# Patient Record
Sex: MALE | Race: ASIAN | NOT HISPANIC OR LATINO | Employment: PART TIME | URBAN - METROPOLITAN AREA
[De-identification: names, ages, dates, MRNs, and addresses within clinical notes are randomized per-mention and may not be internally consistent; named-entity substitution may affect disease eponyms.]

---

## 2017-03-11 ENCOUNTER — HOSPITAL ENCOUNTER (EMERGENCY)
Facility: HOSPITAL | Age: 27
Discharge: HOME | End: 2017-03-11
Attending: EMERGENCY MEDICINE
Payer: MEDICAID

## 2017-03-11 VITALS
DIASTOLIC BLOOD PRESSURE: 74 MMHG | OXYGEN SATURATION: 98 % | HEIGHT: 66 IN | HEART RATE: 80 BPM | WEIGHT: 145 LBS | SYSTOLIC BLOOD PRESSURE: 134 MMHG | RESPIRATION RATE: 18 BRPM | BODY MASS INDEX: 23.3 KG/M2 | TEMPERATURE: 98 F

## 2017-03-11 DIAGNOSIS — T14.90XA INJURY: ICD-10-CM
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
## 2017-03-11 DIAGNOSIS — T07.XXXA ABRASIONS OF MULTIPLE SITES: ICD-10-CM

## 2017-03-11 PROCEDURE — 25000003 PHARM REV CODE 250: Performed by: EMERGENCY MEDICINE

## 2017-03-11 PROCEDURE — 90471 IMMUNIZATION ADMIN: CPT | Performed by: EMERGENCY MEDICINE

## 2017-03-11 PROCEDURE — 90715 TDAP VACCINE 7 YRS/> IM: CPT | Performed by: EMERGENCY MEDICINE

## 2017-03-11 PROCEDURE — 63600175 PHARM REV CODE 636 W HCPCS: Performed by: EMERGENCY MEDICINE

## 2017-03-11 PROCEDURE — 99283 EMERGENCY DEPT VISIT LOW MDM: CPT

## 2017-03-11 RX ORDER — HYDROCODONE BITARTRATE AND ACETAMINOPHEN 10; 325 MG/1; MG/1
1 TABLET ORAL
Status: COMPLETED | OUTPATIENT
Start: 2017-03-11 | End: 2017-03-11

## 2017-03-11 RX ORDER — SILVER SULFADIAZINE 10 G/1000G
CREAM TOPICAL 2 TIMES DAILY
Qty: 30 G | Refills: 0 | Status: SHIPPED | OUTPATIENT
Start: 2017-03-11

## 2017-03-11 RX ORDER — PROMETHAZINE HYDROCHLORIDE 25 MG/1
25 TABLET ORAL
Status: COMPLETED | OUTPATIENT
Start: 2017-03-11 | End: 2017-03-11

## 2017-03-11 RX ORDER — SILVER SULFADIAZINE 10 G/1000G
1 CREAM TOPICAL
Status: COMPLETED | OUTPATIENT
Start: 2017-03-11 | End: 2017-03-11

## 2017-03-11 RX ORDER — HYDROCODONE BITARTRATE AND ACETAMINOPHEN 5; 325 MG/1; MG/1
1 TABLET ORAL EVERY 4 HOURS PRN
Qty: 12 TABLET | Refills: 0 | Status: SHIPPED | OUTPATIENT
Start: 2017-03-11 | End: 2017-03-21

## 2017-03-11 RX ADMIN — SILVER SULFADIAZINE 1 TUBE: 10 CREAM TOPICAL at 11:03

## 2017-03-11 RX ADMIN — CLOSTRIDIUM TETANI TOXOID ANTIGEN (FORMALDEHYDE INACTIVATED), CORYNEBACTERIUM DIPHTHERIAE TOXOID ANTIGEN (FORMALDEHYDE INACTIVATED), BORDETELLA PERTUSSIS TOXOID ANTIGEN (GLUTARALDEHYDE INACTIVATED), BORDETELLA PERTUSSIS FILAMENTOUS HEMAGGLUTININ ANTIGEN (FORMALDEHYDE INACTIVATED), BORDETELLA PERTUSSIS PERTACTIN ANTIGEN, AND BORDETELLA PERTUSSIS FIMBRIAE 2/3 ANTIGEN 0.5 ML: 5; 2; 2.5; 5; 3; 5 INJECTION, SUSPENSION INTRAMUSCULAR at 11:03

## 2017-03-11 RX ADMIN — PROMETHAZINE HYDROCHLORIDE 25 MG: 25 TABLET ORAL at 11:03

## 2017-03-11 RX ADMIN — HYDROCODONE BITARTRATE AND ACETAMINOPHEN 1 TABLET: 10; 325 TABLET ORAL at 11:03

## 2017-03-11 NOTE — DISCHARGE INSTRUCTIONS
C?ng c?, Chi  C?NG C? là tình tr?ng giãn và rách các s?i c?. Nó gây ?au, ??c bi?t là khi tonya?n ??ng b?ng c? ?ó. C?ng có th? có d?u hi?u s?ng phù và thâm tím.  Ch?m sóc t?i vika:  1) Gi? yates vùng b? th??ng ?? gi?m ?au và s?ng. ?i?u này ??c bi?t miriam tr?ng camilla vòng 48 gi? ??u tiên.  2) Ch??m túi ?á (?á c?c camilla m?t túi nh?a, b?c camilla m?t t?m kh?n) camilla 20 phút tommy m?i 1-2 gi? camilla ngày ??u tiên. Quý v? nên ti?p t?c ch??m ?á 3-4 l?n/ngày camilla ngày th? keith và th? ba. Tr? khi ???c h??ng d?n khác, vào ngày th? t?, quý v? có th? ngâm n??c nóng ho?c ch??m nóng (kh?n t?m nh? ???c ngâm camilla n??c nóng) 3-4 l?n/ngày camilla khi nh? nh?ng t?p xoa bóp vùng b? ?nh h??ng.  3) Quý v? có th? dùng acetaminophen (Tylenol) ho?c ibuprofen (Motrin, Advil) ?? ki?m soát c?n ?au, tr? khi ???c kê ??n m?t lo?i thu?c khác. [ L?U Ý : N?u quý v? m?c b?nh je ho?c th?n mãn tính ho?c t?ng b? loét d? dày ho?c ch?y máu camilla akash t? (GI), hãy nói tonya?n v?i bác s? c?a quý v? tr??c khi s? d?ng các lo?i thu?c này.]  4) ??i v?i C?NG C? CHÂN: N?u ???c khuy?n cáo dùng N?NG, không ???c t?a toàn b? s?c n?ng lên chân b? th??ng t?i khi quý v? có th? làm v?y mà không b? ?au. Quý v? có th? selma l?i các ho?t ??ng th? alex khi có th? ??ng lên ng?i kuo?ng và ch?y trên chân b? th??ng mà không th?y ?au.  Shan dõi  v?i bác s? c?a quý v? ho?c c? s? này n?u quý v? không b?t ??u c?i thi?n camilla vòng n?m ngày ti?p shan.  Tìm s? miriam tâm y t? ngay l?p t?c  n?u b?t k? ?i?u nào tommy ?ây x?y ra:  -- Ngón terrell ho?c ngón chân s?ng lên, l?nh, xanh tái, tê ho?c ng?a  -- ?au ho?c s?ng h?n  Date Last Reviewed: 11/19/2015  © 8460-9955 [employer]. [address] [place] [address], [place] [address]. All rights reserved. This information is not intended as a substitute for professional medical care. Always follow your healthcare professional's instructions.

## 2017-03-11 NOTE — ED AVS SNAPSHOT
[facility] -   [id number] [facility] [address]               Fausto Garcia V   3/11/2017 10:57 AM   ED    Description:  Male : 1990   Department:  [facility]           Your Care was Coordinated By:     Provider Role From To    Velasquez Zavaleta Jr., MD Attending Provider 17 1057 --      Reason for Visit     Fall           Diagnoses this Visit        Comments    Fall, initial encounter    -  Primary     Injury         Abrasions of multiple sites           ED Disposition     ED Disposition Condition Comment    Discharge             To Do List           Follow-up Information     Follow up with PCP. Call in 2 days.       These Medications        Disp Refills Start End    hydrocodone-acetaminophen 5-325mg (NORCO) 5-325 mg per tablet 12 tablet 0 3/11/2017 3/21/2017    Take 1 tablet by mouth every 4 (four) hours as needed. - Oral    silver sulfADIAZINE 1% (SILVADENE) 1 % cream 30 g 0 3/11/2017     Apply topically 2 (two) times daily. - Topical (Top)      [facility]     [facility] Care [facility] -  Assistance  Registered nurses in the [facility] provide clinical advisement, health education, appointment booking, and other advisory services.  Call for this free service at [phone].             Medications           Message regarding Medications     Verify the changes and/or additions to your medication regime listed below are the same as discussed with your clinician today.  If any of these changes or additions are incorrect, please notify your healthcare provider.        START taking these NEW medications        Refills    hydrocodone-acetaminophen 5-325mg (NORCO) 5-325 mg per tablet 0    Sig: Take 1 tablet by mouth every 4 (four) hours as needed.    Class: Print    Route: Oral    silver sulfADIAZINE 1% (SILVADENE) 1 % cream 0    Sig: Apply topically 2 (two) times daily.    Class: Print    Route: Topical (Top)     "  These medications were administered today        Dose Freq    hydrocodone-acetaminophen 10-325mg per tablet 1 tablet 1 tablet ED 1 Time    Sig: Take 1 tablet by mouth ED 1 Time.    Class: Normal    Route: Oral    promethazine tablet 25 mg 25 mg ED 1 Time    Sig: Take 1 tablet (25 mg total) by mouth ED 1 Time.    Class: Normal    Route: Oral    silver sulfADIAZINE 1% cream 1 Tube 1 Tube ED 1 Time    Sig: Apply 1 Tube topically ED 1 Time.    Class: Normal    Route: Topical (Top)    Tdap vaccine injection 0.5 mL 0.5 mL Once    Sig: Inject 0.5 mLs into the muscle once.    Class: Normal    Route: Intramuscular           Verify that the below list of medications is an accurate representation of the medications you are currently taking.  If none reported, the list may be blank. If incorrect, please contact your healthcare provider. Carry this list with you in case of emergency.           Current Medications     hydrocodone-acetaminophen 5-325mg (NORCO) 5-325 mg per tablet Take 1 tablet by mouth every 4 (four) hours as needed.    silver sulfADIAZINE 1% (SILVADENE) 1 % cream Apply topically 2 (two) times daily.           Clinical Reference Information           Your Vitals Were     BP Pulse Temp Resp Height Weight    154/76 (BP Location: Right arm) 78 98.1 °F (36.7 °C) (Oral) 18 5' 6" (1.676 m) 65.8 kg (145 lb)    SpO2 BMI             98% 23.4 kg/m2         Allergies as of 3/11/2017     No Known Allergies      Immunizations Administered on Date of Encounter - 3/11/2017     Name Date Dose VIS Date Route    TDAP 3/11/2017 0.5 mL 2/24/2015 Intramuscular      ED Micro, Lab, POCT     None      ED Imaging Orders     Start Ordered       Status Ordering Provider    03/11/17 1115 03/11/17 1114  X-Ray Knee 3 View Left  1 time imaging      Final result     03/11/17 1115 03/11/17 1114  X-Ray Chest PA And Lateral  1 time imaging      Final result     03/11/17 1114 03/11/17 1114  X-Ray Ankle Complete Right  1 time imaging      Final " result         Discharge Instructions         C?ng c?, Chi  C?NG C? là tình tr?ng giãn và rách các s?i c?. Nó gây ?au, ??c bi?t là khi tonya?n ??ng b?ng c? ?ó. C?ng có th? có d?u hi?u s?ng phù và thâm tím.  Ch?m sóc t?i vika:  1) Gi? yates vùng b? th??ng ?? gi?m ?au và s?ng. ?i?u này ??c bi?t miriam tr?ng camilla vòng 48 gi? ??u tiên.  2) Ch??m túi ?á (?á c?c camilla m?t túi nh?a, b?c camilla m?t t?m kh?n) camilla 20 phút tommy m?i 1-2 gi? camilla ngày ??u tiên. Quý v? nên ti?p t?c ch??m ?á 3-4 l?n/ngày camilla ngày th? keith và th? ba. Tr? khi ???c h??ng d?n khác, vào ngày th? t?, quý v? có th? ngâm n??c nóng ho?c ch??m nóng (kh?n t?m nh? ???c ngâm camilla n??c nóng) 3-4 l?n/ngày camilla khi nh? nh?ng t?p xoa bóp vùng b? ?nh h??ng.  3) Quý v? có th? dùng acetaminophen (Tylenol) ho?c ibuprofen (Motrin, Advil) ?? ki?m soát c?n ?au, tr? khi ???c kê ??n m?t lo?i thu?c khác. [ L?U Ý : N?u quý v? m?c b?nh je ho?c th?n mãn tính ho?c t?ng b? loét d? dày ho?c ch?y máu camilla akash t? (GI), hãy nói tonya?n v?i bác s? c?a quý v? tr??c khi s? d?ng các lo?i thu?c này.]  4) ??i v?i C?NG C? CHÂN: N?u ???c khuy?n cáo dùng N?NG, không ???c t?a toàn b? s?c n?ng lên chân b? th??ng t?i khi quý v? có th? làm v?y mà không b? ?au. Quý v? có th? selma l?i các ho?t ??ng th? alex khi có th? ??ng lên ng?i kuo?ng và ch?y trên chân b? th??ng mà không th?y ?au.  Shan dõi  v?i bác s? c?a quý v? ho?c c? s? này n?u quý v? không b?t ??u c?i thi?n camilla vòng n?m ngày ti?p shan.  Tìm s? miriam tâm y t? ngay l?p t?c  n?u b?t k? ?i?u nào tommy ?ây x?y ra:  -- Ngón terrell ho?c ngón chân s?ng lên, l?nh, xanh tái, tê ho?c ng?a  -- ?au ho?c s?ng h?n  Date Last Reviewed: 11/19/2015  © 0780-2351 [employer]. [address] [place] [address], [place] [address]. All rights reserved. This information is not intended as a substitute for professional medical care. Always follow your healthcare professional's instructions.          [facility] Sign-Up     Activating your [facility] account is as easy  as 1-2-3!     1) Visit [email or web address], select Sign Up Now, enter this activation code and your date of birth, then select Next.  [email or web address]  Expires: 4/25/2017 12:26 PM      2) Create a username and password to use when you visit [facility] in the future and select a security question in case you lose your password and select Next.    3) Enter your e-mail address and click Sign Up!    Additional Information  If you have questions, please e-mail [email or web address] or call [phone] to talk to our [facility] staff. Remember, [facility] is NOT to be used for urgent needs. For medical emergencies, dial 911.         Smoking Cessation     If you would like to quit smoking:   You may be eligible for free services if you are a [place] resident and started smoking cigarettes before September 1, 1988.  Call the Smoking Cessation Trust ([facility]) toll free at [phone] or [phone].   Call 7-717-QUIT-NOW if you do not meet the above criteria.             [facility] complies with applicable Federal civil rights laws and does not discriminate on the basis of race, color, national origin, age, disability, or sex.        Language Assistance Services     ATTENTION: Language assistance services are available, free of charge. Please call [phone].      ATENCIÓN: Si habla español, tiene a lisa disposición servicios gratuitos de asistencia lingüística. Llame al [phone].     CHÚ Ý: N?u b?n nói Ti?ng Vi?t, có các d?ch v? h? tr? ngôn ng? mi?n phí dành cho b?n. G?i s? [phone].

## 2017-03-11 NOTE — ED PROVIDER NOTES
SCRIBE #1 NOTE: I, Yasmany Mo, am scribing for, and in the presence of, Velasquez Zavaleta Jr., MD. I have scribed the entire note.      History      Chief Complaint   Patient presents with    Fall     Patient fell climbing up his roof approx.5-7ft landing on the concrete slab, patient denies LOC, patient c/o back pain, head pain, bilateral leg pain         Review of patient's allergies indicates:  No Known Allergies     HPI   HPI    3/11/2017, 11:10 AM   History obtained from the patient      History of Present Illness: Fausto Garcia V is a 26 y.o. male patient who presents to the Emergency Department for head injury s/p fall PTA. The pt states that he was attempt to climbing up his roof when he had slipped off of a 3-4 feet make-shift extension connected to it. He reports having landed on concrete, scraping himself on the way down and hitting his posterior head directly onto the concrete. He reports pain to this posterior head, constant and moderate in severity. He denies any LOC from the fall. The pt also c/o right foot pain and left knee pain. The right foot pain is constant and moderate in severity, worsened with movement. No radiation of pain. The left knee pain is also constant and moderate in severity, worsened with ambulation. Patient denies any weakness, numbness, neck pain, back pain, CP, SOB , abdominal pain, N/V/D or any other sx at this time. No further complaints or concerns at this time.     Arrival mode: Personal vehicle    PCP: Primary Doctor No       Past Medical History:  History reviewed. No pertinent past medical history.    Past Surgical History:  History reviewed. No pertinent surgical history.      Family History:  History reviewed. No pertinent family history.    Social History:  Social History     Social History Main Topics    Smoking status: Not on file    Smokeless tobacco: Not on file    Alcohol use Not on file    Drug use: Not on file    Sexual activity: Not on file       ROS    Review of Systems   Constitutional: Negative for chills and fever.   HENT: Negative for sore throat.    Respiratory: Negative for shortness of breath.    Cardiovascular: Negative for chest pain.   Gastrointestinal: Negative for abdominal pain, diarrhea, nausea and vomiting.   Genitourinary: Negative for dysuria.   Musculoskeletal: Positive for arthralgias (knee, left + foot, right). Negative for back pain, joint swelling and neck pain.        Head injury, posterior (+)   Skin: Negative for rash.   Neurological: Negative for weakness.        LOC (-)   Hematological: Does not bruise/bleed easily.       Physical Exam    Initial Vitals   BP Pulse Resp Temp SpO2   03/11/17 1055 03/11/17 1055 03/11/17 1055 03/11/17 1055 03/11/17 1055   154/76 78 18 98.1 °F (36.7 °C) 98 %      Physical Exam  Nursing Notes and Vital Signs Reviewed.  Constitutional: Patient is in no acute distress. Awake and alert. Well-developed and well-nourished.  Head: Atraumatic. Normocephalic.  Eyes: PERRL. EOM intact. Conjunctivae are not pale. No scleral icterus.  ENT: Mucous membranes are moist. Oropharynx is clear and symmetric.    Neck: Supple. Full ROM. No lymphadenopathy.  Cardiovascular: Regular rate. Regular rhythm. No murmurs, rubs, or gallops. Distal pulses are 2+ and symmetric.  Pulmonary/Chest: No respiratory distress. Clear to auscultation bilaterally. No wheezing, rales, or rhonchi.  Abdominal: Soft and non-distended.  There is no tenderness.  No rebound, guarding, or rigidity. Good bowel sounds.  Genitourinary: No CVA tenderness  Musculoskeletal: Moves all extremities. No obvious deformities. No edema. No calf tenderness.  Right Foot:  No obvious deformity. There is no swelling.  There is no tenderness. Intact sensation to light touch. Distal capillary refill takes less than 2 seconds.  PT and DP pulses are 2+ bilaterally.  LLE: no evident deformity. No swelling. No tenderness. ROM is normal. Cap refill distally is <2 seconds. DP and  "PT pulses are equal and 2+ bilaterally. No motor deficit. No distal sensory deficit  Skin: Warm and dry. There are abrasions located diffusely.   Neurological:  Alert, awake, and appropriate.  Normal speech.  No acute focal neurological deficits are appreciated.  Psychiatric: Normal affect. Good eye contact. Appropriate in content.    ED Course    Procedures  ED Vital Signs:  Vitals:    03/11/17 1055 03/11/17 1232   BP: (!) 154/76 134/74   Pulse: 78 80   Resp: 18 18   Temp: 98.1 °F (36.7 °C) 98.2 °F (36.8 °C)   TempSrc: Oral Oral   SpO2: 98% 98%   Weight: 65.8 kg (145 lb)    Height: 5' 6" (1.676 m)      Imaging Results:  Imaging Results         X-Ray Knee 3 View Left (Final result) Result time:  03/11/17 12:15:36    Final result by Romero Dietz Jr., MD (03/11/17 12:15:36)    Impression:     No acute abnormality identified in the left knee.        Electronically signed by: ROMERO DIETZ M.D.  Date:     03/11/17  Time:    12:15     Narrative:    XR KNEE 3 VIEW LEFT    Clinical history: Trauma.    Findings: No fracture, osteochondral defect or loose intra-articular osteochondral body identified. Joint alignment is anatomic. No significant joint effusion identified. Joint spaces appear well maintained.            X-Ray Chest PA And Lateral (Final result) Result time:  03/11/17 12:14:40    Final result by Romero Dietz Jr., MD (03/11/17 12:14:40)    Impression:     Negative two-view chest.        Electronically signed by: ROMERO DIETZ M.D.  Date:     03/11/17  Time:    12:14     Narrative:    EXAM: [id number] CHEST PA AND LATERAL    CLINICAL HISTORY: Trauma.    COMPARISON: Compare    FINDINGS: The heart size is normal. The lung fields are clear. No acute process is identified.            X-Ray Ankle Complete Right (Final result) Result time:  03/11/17 12:15:14    Final result by Romero iDetz Jr., MD (03/11/17 12:15:14)    Impression:     No acute abnormality identified.          Electronically signed by: ROMERO" ZORAIDA IRBY  Date:     03/11/17  Time:    12:15     Narrative:    XR ANKLE COMPLETE 3 VIEW RIGHT    Clinical history: Trauma.    Findings: No fracture is identified. Joint alignment is anatomic. No significant arthritic changes identified.No osteochondral defect identified.                  The Emergency Provider reviewed the vital signs and test results, which are outlined above.    ED Discussion     12:34 PM: Discussed with pt all pertinent ED information and results. Discussed pt dx and plan of tx. Gave pt all f/u and return to the ED instructions. All questions and concerns were addressed at this time. Pt expresses understanding of information and instructions, and is comfortable with plan to discharge. Pt is stable for discharge.    Closed Head Injury precautions were discussed with patient and/or family/caretaker; specifically that despite unrevealing CT scan or exam, a concussion can represent brain tissue injury.  Second impact syndrome was also discussed.    ED Medication(s):  Medications   hydrocodone-acetaminophen 10-325mg per tablet 1 tablet (1 tablet Oral Given 3/11/17 1129)   promethazine tablet 25 mg (25 mg Oral Given 3/11/17 1129)   silver sulfADIAZINE 1% cream 1 Tube (1 Tube Topical (Top) Given 3/11/17 1129)   Tdap vaccine injection 0.5 mL (0.5 mLs Intramuscular Given 3/11/17 1130)       Discharge Medication List as of 3/11/2017 12:26 PM      START taking these medications    Details   hydrocodone-acetaminophen 5-325mg (NORCO) 5-325 mg per tablet Take 1 tablet by mouth every 4 (four) hours as needed., Starting 3/11/2017, Until Tue 3/21/17, Print      silver sulfADIAZINE 1% (SILVADENE) 1 % cream Apply topically 2 (two) times daily., Starting 3/11/2017, Until Discontinued, Print             Follow-up Information     Follow up with PCP. Call in 2 days.            Medical Decision Making    Medical Decision Making:   Clinical Tests:   Radiological Study: Ordered and Reviewed           Scribe  Attestation:   Scribe #1: I performed the above scribed service and the documentation accurately describes the services I performed. I attest to the accuracy of the note.    Attending:   Physician Attestation Statement for Scribe #1: I, Velasquez Zavaleta Jr., MD, personally performed the services described in this documentation, as scribed by Yasmany Mo, in my presence, and it is both accurate and complete.          Clinical Impression       ICD-10-CM ICD-9-CM   1. Fall, initial encounter W19.XXXA E888.9   2. Injury T14.90 959.9   3. Abrasions of multiple sites T14.8 919.0       Disposition:   Disposition: Discharged  Condition: Stable       Velasquez Zavaleta Jr., MD  03/11/17 1401